# Patient Record
Sex: FEMALE | Race: WHITE | NOT HISPANIC OR LATINO
[De-identification: names, ages, dates, MRNs, and addresses within clinical notes are randomized per-mention and may not be internally consistent; named-entity substitution may affect disease eponyms.]

---

## 2017-03-11 ENCOUNTER — HOSPITAL (OUTPATIENT)
Dept: OTHER | Age: 22
End: 2017-03-11
Attending: EMERGENCY MEDICINE

## 2021-05-04 ENCOUNTER — HOSPITAL ENCOUNTER (OUTPATIENT)
Age: 26
Discharge: HOME | End: 2021-05-04
Payer: COMMERCIAL

## 2021-05-04 VITALS
BODY MASS INDEX: 26.58 KG/M2 | DIASTOLIC BLOOD PRESSURE: 74 MMHG | RESPIRATION RATE: 18 BRPM | HEART RATE: 85 BPM | TEMPERATURE: 98 F | WEIGHT: 150 LBS | SYSTOLIC BLOOD PRESSURE: 139 MMHG | HEIGHT: 63 IN | OXYGEN SATURATION: 97 %

## 2021-05-04 DIAGNOSIS — H81.13 BENIGN PAROXYSMAL POSITIONAL VERTIGO DUE TO BILATERAL VESTIBULAR DISORDER: Primary | ICD-10-CM

## 2021-05-04 PROCEDURE — 99203 OFFICE O/P NEW LOW 30 MIN: CPT | Performed by: NURSE PRACTITIONER

## 2021-05-04 RX ORDER — MECLIZINE HYDROCHLORIDE 50 MG/1
50 TABLET ORAL 3 TIMES DAILY PRN
Qty: 15 TABLET | Refills: 0 | Status: SHIPPED | OUTPATIENT
Start: 2021-05-04 | End: 2021-05-09

## 2021-05-04 RX ORDER — MECLIZINE HYDROCHLORIDE 25 MG/1
25 TABLET ORAL ONCE
Status: COMPLETED | OUTPATIENT
Start: 2021-05-04 | End: 2021-05-04

## 2021-05-04 NOTE — ED QUICK NOTES
meds a discharge inst given and reviewed . Call pcp and follow up with pcp in office.  Go to the ed  For new or worse concerns

## 2021-05-04 NOTE — ED PROVIDER NOTES
Patient Seen in: [facility]      History   Patient presents with:  Dizziness  Eye Problem    Stated Complaint: DIZZNESS/BLURRED VISION    HPI/Subjective:   Patient presents to immediate care accompanied by self.   Patient states her parents  hematuria, pelvic pain and urgency. Musculoskeletal: Negative for back pain. Skin: Negative for color change, pallor, rash and wound. Neurological: Positive for dizziness.  Negative for tremors, seizures, syncope, facial asymmetry, speech difficulty, wheezing. Abdominal:      General: Abdomen is flat. Bowel sounds are normal.      Palpations: Abdomen is soft. Tenderness: There is no abdominal tenderness. There is no right CVA tenderness, left CVA tenderness, guarding or rebound.    Meenakshi Montanez by self. Patient states her parents dropped her off. Patient states 4 days ago while she was at work she developed sudden onset of dizziness and blurred vision.   Patient states she was standing and all of a sudden she developed a spinning sensation and b understood instructions. Patient feels safe to go home.                    Disposition and Plan     Clinical Impression:  Benign paroxysmal positional vertigo due to bilateral vestibular disorder  (primary encounter diagnosis)     Disposition:  Discharge

## 2021-05-04 NOTE — ED INITIAL ASSESSMENT (HPI)
Ambulatory with complaints of blurred vision and dizziness for 4 days no recent uri, moves slowly  Thought she missed her psych doses but her md said that would not cause her issues.

## 2024-08-09 ENCOUNTER — LAB SERVICES (OUTPATIENT)
Age: 29
End: 2024-08-09

## 2024-08-09 PROBLEM — L70.0 ACNE VULGARIS: Status: RESOLVED | Noted: 2024-08-09 | Resolved: 2024-08-09

## 2024-08-09 PROBLEM — R42 VERTIGO: Status: ACTIVE | Noted: 2018-01-01

## 2024-08-09 PROBLEM — E06.3 HASHIMOTO'S DISEASE: Status: ACTIVE | Noted: 2024-04-16

## 2024-08-09 PROBLEM — R55 SYNCOPE AND COLLAPSE: Status: RESOLVED | Noted: 2018-11-08 | Resolved: 2024-08-09

## 2024-08-09 PROBLEM — N80.9 ENDOMETRIOSIS: Status: RESOLVED | Noted: 2019-06-01 | Resolved: 2024-08-09

## 2024-08-09 PROBLEM — E03.8 HYPOTHYROIDISM DUE TO HASHIMOTO'S THYROIDITIS: Status: ACTIVE | Noted: 2024-08-09

## 2024-08-09 PROBLEM — N80.9 ENDOMETRIOSIS: Status: ACTIVE | Noted: 2019-06-01

## 2024-08-09 PROBLEM — L70.0 ACNE VULGARIS: Status: ACTIVE | Noted: 2024-08-09

## 2024-08-09 PROBLEM — R42 VERTIGO: Status: RESOLVED | Noted: 2018-01-01 | Resolved: 2024-08-09

## 2024-08-09 PROBLEM — E06.3 HYPOTHYROIDISM DUE TO HASHIMOTO'S THYROIDITIS: Status: ACTIVE | Noted: 2024-08-09

## 2024-08-09 PROBLEM — R55 SYNCOPE AND COLLAPSE: Status: ACTIVE | Noted: 2018-11-08

## 2024-08-14 ENCOUNTER — NURSE TRIAGE (OUTPATIENT)
Dept: FAMILY MEDICINE | Age: 29
End: 2024-08-14

## 2024-08-30 ENCOUNTER — APPOINTMENT (OUTPATIENT)
Age: 29
End: 2024-08-30

## 2024-09-27 ENCOUNTER — LAB REQUISITION (OUTPATIENT)
Age: 29
End: 2024-09-27

## 2024-09-27 ENCOUNTER — LAB SERVICES (OUTPATIENT)
Age: 29
End: 2024-09-27

## 2024-09-27 DIAGNOSIS — E03.8 OTHER SPECIFIED HYPOTHYROIDISM: ICD-10-CM

## 2024-09-27 DIAGNOSIS — E06.3 AUTOIMMUNE THYROIDITIS: ICD-10-CM

## 2024-09-27 LAB
T4 FREE SERPL-MCNC: 0.9 NG/DL (ref 0.8–1.5)
TSH SERPL-ACNC: 0.03 MCUNITS/ML (ref 0.35–5)

## 2024-09-27 PROCEDURE — PSEU8262 FREE T4: Performed by: CLINICAL MEDICAL LABORATORY

## 2024-09-27 PROCEDURE — 36415 COLL VENOUS BLD VENIPUNCTURE: CPT | Performed by: CLINICAL MEDICAL LABORATORY

## 2024-09-27 PROCEDURE — PSEU8299 THYROID STIMULATING HORMONE: Performed by: CLINICAL MEDICAL LABORATORY

## 2024-09-27 PROCEDURE — 84443 ASSAY THYROID STIM HORMONE: CPT | Performed by: CLINICAL MEDICAL LABORATORY

## 2024-09-27 PROCEDURE — 84439 ASSAY OF FREE THYROXINE: CPT | Performed by: CLINICAL MEDICAL LABORATORY

## 2025-02-07 ENCOUNTER — APPOINTMENT (OUTPATIENT)
Age: 30
End: 2025-02-07

## 2025-02-14 ENCOUNTER — LAB SERVICES (OUTPATIENT)
Age: 30
End: 2025-02-14

## 2025-02-14 DIAGNOSIS — E06.3 HYPOTHYROIDISM DUE TO HASHIMOTO'S THYROIDITIS: ICD-10-CM

## 2025-02-14 DIAGNOSIS — E06.3 HASHIMOTO'S DISEASE: ICD-10-CM

## 2025-02-14 PROCEDURE — 84439 ASSAY OF FREE THYROXINE: CPT | Performed by: CLINICAL MEDICAL LABORATORY

## 2025-02-14 PROCEDURE — 36415 COLL VENOUS BLD VENIPUNCTURE: CPT | Performed by: CLINICAL MEDICAL LABORATORY

## 2025-02-14 PROCEDURE — 84443 ASSAY THYROID STIM HORMONE: CPT | Performed by: CLINICAL MEDICAL LABORATORY

## 2025-02-15 LAB
T4 FREE SERPL-MCNC: 1.1 NG/DL (ref 0.8–1.5)
TSH SERPL-ACNC: 0.34 MCUNITS/ML (ref 0.35–5)

## 2025-06-13 ENCOUNTER — APPOINTMENT (OUTPATIENT)
Age: 30
End: 2025-06-13